# Patient Record
Sex: FEMALE | Race: WHITE | NOT HISPANIC OR LATINO | Employment: OTHER | URBAN - METROPOLITAN AREA
[De-identification: names, ages, dates, MRNs, and addresses within clinical notes are randomized per-mention and may not be internally consistent; named-entity substitution may affect disease eponyms.]

---

## 2017-03-30 ENCOUNTER — TRANSCRIBE ORDERS (OUTPATIENT)
Dept: LAB | Facility: OTHER | Age: 82
End: 2017-03-30

## 2017-03-30 ENCOUNTER — APPOINTMENT (OUTPATIENT)
Dept: LAB | Facility: OTHER | Age: 82
End: 2017-03-30
Payer: MEDICARE

## 2017-03-30 DIAGNOSIS — K21.9 GASTROESOPHAGEAL REFLUX DISEASE, ESOPHAGITIS PRESENCE NOT SPECIFIED: Primary | ICD-10-CM

## 2017-03-30 DIAGNOSIS — E03.9 UNSPECIFIED HYPOTHYROIDISM: ICD-10-CM

## 2017-03-30 LAB
ALBUMIN SERPL BCP-MCNC: 3.7 G/DL (ref 3.5–5)
ALP SERPL-CCNC: 79 U/L (ref 46–116)
ALT SERPL W P-5'-P-CCNC: 22 U/L (ref 12–78)
ANION GAP SERPL CALCULATED.3IONS-SCNC: 8 MMOL/L (ref 4–13)
AST SERPL W P-5'-P-CCNC: 11 U/L (ref 5–45)
BASOPHILS # BLD AUTO: 0.01 THOUSANDS/ΜL (ref 0–0.1)
BASOPHILS NFR BLD AUTO: 0 % (ref 0–1)
BILIRUB SERPL-MCNC: 0.75 MG/DL (ref 0.2–1)
BUN SERPL-MCNC: 23 MG/DL (ref 5–25)
CALCIUM SERPL-MCNC: 9.2 MG/DL (ref 8.3–10.1)
CHLORIDE SERPL-SCNC: 103 MMOL/L (ref 100–108)
CO2 SERPL-SCNC: 29 MMOL/L (ref 21–32)
CREAT SERPL-MCNC: 0.68 MG/DL (ref 0.6–1.3)
EOSINOPHIL # BLD AUTO: 0.07 THOUSAND/ΜL (ref 0–0.61)
EOSINOPHIL NFR BLD AUTO: 1 % (ref 0–6)
ERYTHROCYTE [DISTWIDTH] IN BLOOD BY AUTOMATED COUNT: 12.7 % (ref 11.6–15.1)
GFR SERPL CREATININE-BSD FRML MDRD: >60 ML/MIN/1.73SQ M
GLUCOSE P FAST SERPL-MCNC: 96 MG/DL (ref 65–99)
HCT VFR BLD AUTO: 43.6 % (ref 34.8–46.1)
HGB BLD-MCNC: 14.7 G/DL (ref 11.5–15.4)
LYMPHOCYTES # BLD AUTO: 1.73 THOUSANDS/ΜL (ref 0.6–4.47)
LYMPHOCYTES NFR BLD AUTO: 34 % (ref 14–44)
MCH RBC QN AUTO: 30.5 PG (ref 26.8–34.3)
MCHC RBC AUTO-ENTMCNC: 33.7 G/DL (ref 31.4–37.4)
MCV RBC AUTO: 91 FL (ref 82–98)
MONOCYTES # BLD AUTO: 0.35 THOUSAND/ΜL (ref 0.17–1.22)
MONOCYTES NFR BLD AUTO: 7 % (ref 4–12)
NEUTROPHILS # BLD AUTO: 2.86 THOUSANDS/ΜL (ref 1.85–7.62)
NEUTS SEG NFR BLD AUTO: 58 % (ref 43–75)
NRBC BLD AUTO-RTO: 0 /100 WBCS
PLATELET # BLD AUTO: 270 THOUSANDS/UL (ref 149–390)
PMV BLD AUTO: 9.9 FL (ref 8.9–12.7)
POTASSIUM SERPL-SCNC: 4.3 MMOL/L (ref 3.5–5.3)
PROT SERPL-MCNC: 7.3 G/DL (ref 6.4–8.2)
RBC # BLD AUTO: 4.82 MILLION/UL (ref 3.81–5.12)
SODIUM SERPL-SCNC: 140 MMOL/L (ref 136–145)
TSH SERPL DL<=0.05 MIU/L-ACNC: 1.09 UIU/ML (ref 0.36–3.74)
WBC # BLD AUTO: 5.04 THOUSAND/UL (ref 4.31–10.16)

## 2017-03-30 PROCEDURE — 85025 COMPLETE CBC W/AUTO DIFF WBC: CPT

## 2017-03-30 PROCEDURE — 84443 ASSAY THYROID STIM HORMONE: CPT

## 2017-03-30 PROCEDURE — 80053 COMPREHEN METABOLIC PANEL: CPT

## 2017-03-30 PROCEDURE — 36415 COLL VENOUS BLD VENIPUNCTURE: CPT

## 2018-01-10 NOTE — MISCELLANEOUS
Dear Ms Finnegan: We missed you for your originally scheduled neurological followup appointment with Dr Kassandra Cook  Please call at your earliest convenience to reschedule this appointment      Sincerely,     Salud Ramires           Electronically signed by:Kayla Bee   Feb 15 2016  5:43PM EST Co-author

## 2018-03-19 ENCOUNTER — APPOINTMENT (OUTPATIENT)
Dept: RADIOLOGY | Facility: CLINIC | Age: 83
End: 2018-03-19
Payer: MEDICARE

## 2018-03-19 ENCOUNTER — OFFICE VISIT (OUTPATIENT)
Dept: URGENT CARE | Facility: CLINIC | Age: 83
End: 2018-03-19
Payer: MEDICARE

## 2018-03-19 VITALS
DIASTOLIC BLOOD PRESSURE: 55 MMHG | BODY MASS INDEX: 19.45 KG/M2 | HEIGHT: 61 IN | TEMPERATURE: 98.4 F | SYSTOLIC BLOOD PRESSURE: 88 MMHG | HEART RATE: 110 BPM | RESPIRATION RATE: 16 BRPM | WEIGHT: 103 LBS | OXYGEN SATURATION: 98 %

## 2018-03-19 DIAGNOSIS — M25.552 HIP PAIN, ACUTE, LEFT: Primary | ICD-10-CM

## 2018-03-19 PROCEDURE — 99203 OFFICE O/P NEW LOW 30 MIN: CPT | Performed by: PHYSICIAN ASSISTANT

## 2018-03-19 PROCEDURE — G0463 HOSPITAL OUTPT CLINIC VISIT: HCPCS | Performed by: PHYSICIAN ASSISTANT

## 2018-03-19 PROCEDURE — 73502 X-RAY EXAM HIP UNI 2-3 VIEWS: CPT

## 2018-03-19 PROCEDURE — 72190 X-RAY EXAM OF PELVIS: CPT

## 2018-03-19 RX ORDER — LORAZEPAM 1 MG/1
1 TABLET ORAL DAILY
COMMUNITY
Start: 2015-11-11

## 2018-03-19 RX ORDER — LEVOTHYROXINE SODIUM 0.07 MG/1
75 TABLET ORAL
COMMUNITY

## 2018-03-19 RX ORDER — UBIDECARENONE 10 MG
1 CAPSULE ORAL DAILY
COMMUNITY

## 2018-03-19 RX ORDER — DONEPEZIL HYDROCHLORIDE 5 MG/1
5 TABLET, FILM COATED ORAL
Refills: 3 | COMMUNITY
Start: 2018-01-08

## 2018-03-19 RX ORDER — SENNOSIDES 8.6 MG
2 TABLET ORAL DAILY
COMMUNITY

## 2018-03-19 RX ORDER — BUSPIRONE HYDROCHLORIDE 5 MG/1
5 TABLET ORAL 3 TIMES DAILY
COMMUNITY
Start: 2016-07-07

## 2018-03-19 RX ORDER — BENZTROPINE MESYLATE 0.5 MG/1
1 TABLET ORAL 3 TIMES DAILY
COMMUNITY
Start: 2017-12-18 | End: 2018-07-29 | Stop reason: ALTCHOICE

## 2018-03-19 RX ORDER — DULOXETIN HYDROCHLORIDE 30 MG/1
30 CAPSULE, DELAYED RELEASE ORAL DAILY
COMMUNITY
Start: 2017-11-30

## 2018-03-19 NOTE — PATIENT INSTRUCTIONS
No proximal humerus fracture  No pelvic fracture on my read  Will have Radiology confrim  Continue wheel chair and walker/assistive device as needed  If not improved in 1 week see ortho  Tylenol for pain  Can ice over the hip/pelvis  Follow up with PCP in 3-5 days  Proceed to  ER if symptoms worsen

## 2018-03-19 NOTE — PROGRESS NOTES
[facility]        NAME: Natalya Downing is a [de-identified] y o  female  : 1935    MRN: [id number]  DATE: 2018  TIME: 9:30 AM    Assessment and Plan   Hip pain, acute, left [M25 552]  1  Hip pain, acute, left  XR hip/pelv 2-3 vws left if performed    XR pelvis complete 3+ vw         Patient Instructions     No proximal humerus fracture  No pelvic fracture on my read  Will have Radiology confrim  Continue wheel chair and walker/assistive device as needed  If not improved in 1 week see ortho  Tylenol for pain  Can ice over the hip/pelvis  Discussed Xr findings with her daughter  They will f/u with PCP  Follow up with PCP in 3-5 days  Proceed to  ER if symptoms worsen  Chief Complaint     Chief Complaint   Patient presents with    Fall     Pt fell on left side yesterday getting up from chair     Hip Pain     Left         History of Present Illness         8year-old female presents with her daughter for left-sided hip pain from yesterday  She fell out of chair on her left hip and had some pain  She never put weight on it after the fall  They headache carry her to transfer her from a bed to wheelchair  She normally uses a walker  She has been in the wheelchair since the fall  Patient is a bit confusing his Parkinson's  Family relates that she was localizing the pain in the left side but has been moving her leg          Review of Systems   Review of Systems      Current Medications       Current Outpatient Prescriptions:     benztropine (COGENTIN) 0 5 mg tablet, 1 tablet 3 (three) times a day, Disp: , Rfl:     busPIRone (BUSPAR) 5 mg tablet, 5 mg 3 (three) times a day, Disp: , Rfl:     DULoxetine (CYMBALTA) 30 mg delayed release capsule, 30 mg daily, Disp: , Rfl:     LORazepam (ATIVAN) 1 mg tablet, Take 1 tablet by mouth daily, Disp: , Rfl:     aspirin 81 MG tablet, Take 1 tablet by mouth, Disp: , Rfl:     carbidopa-levodopa (SINEMET)  mg per tablet, Take 1 tablet by mouth 4 (four) times a day, Disp: , Rfl:     Coenzyme Q10 10 MG capsule, Take 1 capsule by mouth daily, Disp: , Rfl:     donepezil (ARICEPT) 5 mg tablet, Take 5 mg by mouth daily at bedtime, Disp: , Rfl: 3    levothyroxine 75 mcg tablet, Take 75 mcg by mouth, Disp: , Rfl:     senna (SENOKOT) 8 6 mg, 2 tablets daily, Disp: , Rfl:     Current Allergies     Allergies as of 03/19/2018    (No Known Allergies)            The following portions of the patient's history were reviewed and updated as appropriate: allergies, current medications, past family history, past medical history, past social history, past surgical history and problem list      Past Medical History:   Diagnosis Date    [facility]        History reviewed  No pertinent surgical history  History reviewed  No pertinent family history  Medications have been verified  Objective   BP (!) 88/40   Pulse (!) 110   Temp 98 4 °F (36 9 °C) (Tympanic)   Resp 16   Ht 5' 1" (1 549 m)   Wt 46 7 kg (103 lb) Comment: stated by , pt unable to stand on scale  SpO2 98%   BMI 19 46 kg/m²        Physical Exam     Physical Exam   Constitutional: She appears distressed  HENT:   Head: Normocephalic and atraumatic  Musculoskeletal:     Left hip nontender in the groin or lateral over greater truck  Full passive range of motion of the hip without resisting me  She is tender along the posterior lateral pelvic rim and the sacrum  No tenderness along the pubic bone  Neurovascular intact left foot  Neurological: She is alert  Coordination normal      Mild tremor    Patient does withdraw from pain           XR hip/pelv 2-3 vws left if performed   Final Result by Mary Sanford MD (03/19 1137)      No hip fracture or dislocation visible            Workstation performed: [id number]         XR pelvis complete 3+ vw   Final Result by Mary Sanford MD (03/19 1127)      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

## 2018-04-06 DIAGNOSIS — G20 PD (PARKINSON'S DISEASE) (HCC): Primary | ICD-10-CM

## 2018-04-06 RX ORDER — CARBIDOPA/LEVODOPA 25MG-250MG
TABLET ORAL
Qty: 120 TABLET | Refills: 3 | Status: SHIPPED | OUTPATIENT
Start: 2018-04-06

## 2018-06-04 ENCOUNTER — TRANSCRIBE ORDERS (OUTPATIENT)
Dept: ADMINISTRATIVE | Facility: HOSPITAL | Age: 83
End: 2018-06-04

## 2018-06-04 ENCOUNTER — APPOINTMENT (OUTPATIENT)
Dept: LAB | Facility: CLINIC | Age: 83
End: 2018-06-04
Payer: MEDICARE

## 2018-06-04 DIAGNOSIS — I51.9 MYXEDEMA HEART DISEASE: Primary | ICD-10-CM

## 2018-06-04 DIAGNOSIS — E03.9 MYXEDEMA HEART DISEASE: Primary | ICD-10-CM

## 2018-06-04 DIAGNOSIS — D68.52 HETEROZYGOUS FOR PROTHROMBIN G20210A MUTATION (HCC): ICD-10-CM

## 2018-06-04 DIAGNOSIS — E03.9 MYXEDEMA HEART DISEASE: ICD-10-CM

## 2018-06-04 DIAGNOSIS — I51.9 MYXEDEMA HEART DISEASE: ICD-10-CM

## 2018-06-04 LAB
ALBUMIN SERPL BCP-MCNC: 3.9 G/DL (ref 3.5–5)
ALP SERPL-CCNC: 85 U/L (ref 46–116)
ALT SERPL W P-5'-P-CCNC: 16 U/L (ref 12–78)
ANION GAP SERPL CALCULATED.3IONS-SCNC: 8 MMOL/L (ref 4–13)
AST SERPL W P-5'-P-CCNC: 11 U/L (ref 5–45)
BASOPHILS # BLD AUTO: 0.03 THOUSANDS/ΜL (ref 0–0.1)
BASOPHILS NFR BLD AUTO: 1 % (ref 0–1)
BILIRUB SERPL-MCNC: 0.7 MG/DL (ref 0.2–1)
BUN SERPL-MCNC: 26 MG/DL (ref 5–25)
CALCIUM SERPL-MCNC: 9.3 MG/DL (ref 8.3–10.1)
CHLORIDE SERPL-SCNC: 103 MMOL/L (ref 100–108)
CO2 SERPL-SCNC: 27 MMOL/L (ref 21–32)
CREAT SERPL-MCNC: 0.66 MG/DL (ref 0.6–1.3)
EOSINOPHIL # BLD AUTO: 0.07 THOUSAND/ΜL (ref 0–0.61)
EOSINOPHIL NFR BLD AUTO: 1 % (ref 0–6)
ERYTHROCYTE [DISTWIDTH] IN BLOOD BY AUTOMATED COUNT: 12.5 % (ref 11.6–15.1)
GFR SERPL CREATININE-BSD FRML MDRD: 84 ML/MIN/1.73SQ M
GLUCOSE P FAST SERPL-MCNC: 83 MG/DL (ref 65–99)
HCT VFR BLD AUTO: 46.7 % (ref 34.8–46.1)
HGB BLD-MCNC: 14.8 G/DL (ref 11.5–15.4)
IMM GRANULOCYTES # BLD AUTO: 0.03 THOUSAND/UL (ref 0–0.2)
IMM GRANULOCYTES NFR BLD AUTO: 1 % (ref 0–2)
LYMPHOCYTES # BLD AUTO: 1.47 THOUSANDS/ΜL (ref 0.6–4.47)
LYMPHOCYTES NFR BLD AUTO: 25 % (ref 14–44)
MCH RBC QN AUTO: 30.5 PG (ref 26.8–34.3)
MCHC RBC AUTO-ENTMCNC: 31.7 G/DL (ref 31.4–37.4)
MCV RBC AUTO: 96 FL (ref 82–98)
MONOCYTES # BLD AUTO: 0.41 THOUSAND/ΜL (ref 0.17–1.22)
MONOCYTES NFR BLD AUTO: 7 % (ref 4–12)
NEUTROPHILS # BLD AUTO: 3.9 THOUSANDS/ΜL (ref 1.85–7.62)
NEUTS SEG NFR BLD AUTO: 65 % (ref 43–75)
NRBC BLD AUTO-RTO: 0 /100 WBCS
PLATELET # BLD AUTO: 321 THOUSANDS/UL (ref 149–390)
PMV BLD AUTO: 9.2 FL (ref 8.9–12.7)
POTASSIUM SERPL-SCNC: 4.2 MMOL/L (ref 3.5–5.3)
PROT SERPL-MCNC: 7.5 G/DL (ref 6.4–8.2)
RBC # BLD AUTO: 4.85 MILLION/UL (ref 3.81–5.12)
SODIUM SERPL-SCNC: 138 MMOL/L (ref 136–145)
TSH SERPL DL<=0.05 MIU/L-ACNC: 0.82 UIU/ML (ref 0.36–3.74)
WBC # BLD AUTO: 5.91 THOUSAND/UL (ref 4.31–10.16)

## 2018-06-04 PROCEDURE — 80053 COMPREHEN METABOLIC PANEL: CPT

## 2018-06-04 PROCEDURE — 36415 COLL VENOUS BLD VENIPUNCTURE: CPT

## 2018-06-04 PROCEDURE — 85025 COMPLETE CBC W/AUTO DIFF WBC: CPT

## 2018-06-04 PROCEDURE — 84443 ASSAY THYROID STIM HORMONE: CPT

## 2018-07-29 ENCOUNTER — APPOINTMENT (OUTPATIENT)
Dept: RADIOLOGY | Facility: CLINIC | Age: 83
End: 2018-07-29
Payer: MEDICARE

## 2018-07-29 ENCOUNTER — OFFICE VISIT (OUTPATIENT)
Dept: URGENT CARE | Facility: CLINIC | Age: 83
End: 2018-07-29
Payer: MEDICARE

## 2018-07-29 VITALS
SYSTOLIC BLOOD PRESSURE: 138 MMHG | OXYGEN SATURATION: 97 % | HEART RATE: 92 BPM | TEMPERATURE: 98.4 F | RESPIRATION RATE: 20 BRPM | WEIGHT: 99 LBS | HEIGHT: 60 IN | BODY MASS INDEX: 19.44 KG/M2 | DIASTOLIC BLOOD PRESSURE: 70 MMHG

## 2018-07-29 DIAGNOSIS — R06.00 DYSPNEA, UNSPECIFIED TYPE: ICD-10-CM

## 2018-07-29 DIAGNOSIS — R06.00 DYSPNEA, UNSPECIFIED TYPE: Primary | ICD-10-CM

## 2018-07-29 PROCEDURE — G0463 HOSPITAL OUTPT CLINIC VISIT: HCPCS | Performed by: PHYSICIAN ASSISTANT

## 2018-07-29 PROCEDURE — 99203 OFFICE O/P NEW LOW 30 MIN: CPT | Performed by: PHYSICIAN ASSISTANT

## 2018-07-29 PROCEDURE — 71046 X-RAY EXAM CHEST 2 VIEWS: CPT

## 2018-07-29 RX ORDER — LORAZEPAM 1 MG/1
TABLET ORAL
COMMUNITY
Start: 2018-05-12

## 2018-07-29 NOTE — PATIENT INSTRUCTIONS
1  Shortness of breath  -02 sat is 97% and patient appears comfortable and is in no distress  -Chest x-ray is negative for acute abnormality- if radiology read differs I will call you  -Call PCP tomorrow for follow-up    Go to ER with worsening symptoms, shortness of breath, fever, chest pain, or any signs of distress    Dyspnea   AMBULATORY CARE:   What is dyspnea? Dyspnea is breathing difficulty or discomfort  You may have labored, painful, or shallow breathing  You may feel breathless or short of breath  Dyspnea can occur during rest or with activity  You may have dyspnea for a short time, or it might become chronic  Dyspnea is often a symptom of a disease or condition  An allergic reaction, anxiety, or travel to high altitudes can increase your risk for dyspnea  Your risk is also increased by a lung condition such as asthma, a heart condition such as heart failure, or a nerve condition  Being overweight or smoking cigarettes can also lead to dyspnea  Signs and symptoms that can occur with dyspnea:   · Chest tightness or pain    · Cough or a coarse or high-pitched noise when you breathe    · Pale and sweaty, cool skin    · Confusion and tiredness    · Bluish-gray lips or nails  Seek care immediately if:   · Your signs and symptoms are the same or worse within 24 hours of treatment  · You have shaking chills or a fever over 102°F      · You have new pain, pressure, or tightness in your chest      · You have a new or worse cough or wheezing, or you cough up blood  · You feel like you cannot get enough air  · The skin over your ribs or on your neck sinks in when you breathe  · You have a severe headache with vomiting and abdominal pain  · You feel confused or dizzy  Contact your healthcare provider if:   · You have questions or concerns about your condition or care  Treatment:  You will work with your healthcare provider to treat the condition causing your dyspnea   You may need the following to improve your symptoms:  · Oxygen therapy  may be used to help you breathe easier  You may need oxygen if your blood oxygen level is lower than it should be  · Medicines  may be used to treat the cause of your dyspnea  Medicines may reduce swelling in your airway or decrease extra fluid from around your heart or lungs  Other medicines may be used to decrease anxiety and help you feel calm and relaxed  · Pulmonary rehabilitation  is used to reduce your symptoms while keeping you active  You may learn breathing techniques, muscle strengthening, and how to pace yourself when you are active  Manage long-term dyspnea:   · Create an action plan  You and your healthcare provider can work together to create a plan for how to handle episodes of dyspnea  The plan can include daily activities, treatment changes, and what to do if you have severe breathing problems  · Lean forward on your elbows when you sit  This helps your lungs expand and may make it easier to breathe  · Use pursed-lip breathing any time you feel short of breath  Breathe in through your nose and then slowly breathe out through your mouth with your lips slightly puckered  It should take you twice as long to breathe out as it did to breathe in  · Do not smoke  Nicotine and other chemicals in cigarettes and cigars can cause lung damage and make it harder to breathe  Ask your healthcare provider for information if you currently smoke and need help to quit  E-cigarettes or smokeless tobacco still contain nicotine  Talk to your healthcare provider before you use these products  · Reach or maintain a healthy weight  Your healthcare provider can help you create a safe weight loss plan if you are overweight  · Exercise as directed  Exercise can help your lungs work more easily  Exercise can also help you lose weight if needed  Try to get at least 30 minutes of exercise most days of the week   Your healthcare provider can help you create an exercise plan that is safe for you  Follow up with your healthcare provider or specialist as directed:  Write down your questions so you remember to ask them during your visits  © 2017 [employer] Information is for End User's use only and may not be sold, redistributed or otherwise used for commercial purposes  All illustrations and images included in CareNotes® are the copyrighted property of A [employer]  or [employer]  The above information is an  only  It is not intended as medical advice for individual conditions or treatments  Talk to your doctor, nurse or pharmacist before following any medical regimen to see if it is safe and effective for you

## 2018-07-29 NOTE — PROGRESS NOTES
[facility]        NAME: Gosia Fermin is a [de-identified] y o  female  : 1935    MRN: [id number]  DATE: 2018  TIME: 9:28 AM    Assessment and Plan   Dyspnea, unspecified type [R06 00]  1  Dyspnea, unspecified type  XR chest pa & lateral         Patient Instructions     1  Shortness of breath  -02 sat is 97% and patient appears comfortable and is in no distress  Patient has no complaints of chest pain or shortness of breath on exam  -Chest x-ray is negative for acute abnormality- if radiology read differs I will call you  -Call PCP tomorrow for follow-up    Go to ER with worsening symptoms, shortness of breath, fever, chest pain, or any signs of distress    Chief Complaint     Chief Complaint   Patient presents with    Shortness of Breath     x one week on and off         History of Present Illness       Patient is an 82-year-old female with a medical history of Parkinson's disease and dementia who presents today with her  and her daughter for evaluation of shortness of breath  History is obtained via the patient's daughter  Patient's daughter states that for the past week and a half, the patient has been randomly stating that she cannot breathe  Patient's  states that this morning around 7:00 a m  while sitting at the kitchen table, the patient stated that she could not breathe  Patient's  states however the patient was clearly breathing and was not in any distress  He states that it only lasted for a few seconds  They state that at this time the patient appears to be in her normal state of health  The patient's daughter feels that this happens when the patient gets anxious  The patient's daughter also states that she has been eating less than normal as well  The patient denies having any chest pain or shortness of breath at this time   They have an appointment on Wednesday with her Neurologist         Review of Systems   Review of Systems   Constitutional: Negative for chills and fever  Respiratory: Negative for cough and shortness of breath  Cardiovascular: Negative for chest pain  Gastrointestinal: Negative for abdominal pain  Neurological: Negative for headaches  Current Medications       Current Outpatient Prescriptions:     LORazepam (ATIVAN) 1 mg tablet, TAKE 1 OR 2 TABLETS AT BEDTIME, Disp: , Rfl:     aspirin 81 MG tablet, Take 1 tablet by mouth, Disp: , Rfl:     busPIRone (BUSPAR) 5 mg tablet, 5 mg 3 (three) times a day, Disp: , Rfl:     carbidopa-levodopa (SINEMET)  mg per tablet, Take 1 tablet by mouth 4 (four) times a day, Disp: , Rfl:     carbidopa-levodopa (SINEMET)  mg per tablet, TAKE 1 TABLET BY MOUTH EVERY 6 HOURS, Disp: 120 tablet, Rfl: 3    Coenzyme Q10 10 MG capsule, Take 1 capsule by mouth daily, Disp: , Rfl:     donepezil (ARICEPT) 5 mg tablet, Take 5 mg by mouth daily at bedtime, Disp: , Rfl: 3    DULoxetine (CYMBALTA) 30 mg delayed release capsule, 30 mg daily, Disp: , Rfl:     levothyroxine 75 mcg tablet, Take 75 mcg by mouth, Disp: , Rfl:     LORazepam (ATIVAN) 1 mg tablet, Take 1 tablet by mouth daily, Disp: , Rfl:     senna (SENOKOT) 8 6 mg, 2 tablets daily, Disp: , Rfl:     Current Allergies     Allergies as of 07/29/2018    (No Known Allergies)            The following portions of the patient's history were reviewed and updated as appropriate: allergies, current medications, past family history, past medical history, past social history, past surgical history and problem list      Past Medical History:   Diagnosis Date    [facility]        No past surgical history on file  No family history on file  Medications have been verified  Objective   /70   Pulse 92   Temp 98 4 °F (36 9 °C) (Oral)   Resp 20   Ht 5' (1 524 m)   Wt 44 9 kg (99 lb)   SpO2 97%   BMI 19 33 kg/m²        Physical Exam     Physical Exam   Constitutional: She appears well-developed and well-nourished  No distress  HENT:   Mouth/Throat: Oropharynx is clear and moist    Cardiovascular: Normal rate, regular rhythm and normal heart sounds  Pulmonary/Chest: Effort normal and breath sounds normal  No respiratory distress  She has no wheezes  She has no rales  Neurological: She is alert  Patient has dementia and Parkinson's   Skin: Skin is warm and dry  She is not diaphoretic  Psychiatric: She has a normal mood and affect  Nursing note and vitals reviewed

## 2019-04-20 ENCOUNTER — APPOINTMENT (OUTPATIENT)
Dept: LAB | Facility: CLINIC | Age: 84
End: 2019-04-20
Payer: MEDICARE

## 2019-04-20 ENCOUNTER — TRANSCRIBE ORDERS (OUTPATIENT)
Dept: ADMINISTRATIVE | Facility: HOSPITAL | Age: 84
End: 2019-04-20

## 2019-04-20 DIAGNOSIS — R30.0 DYSURIA: ICD-10-CM

## 2019-04-20 DIAGNOSIS — R30.0 DYSURIA: Primary | ICD-10-CM

## 2019-04-20 LAB
BILIRUB UR QL STRIP: NEGATIVE
CLARITY UR: CLEAR
COLOR UR: YELLOW
GLUCOSE UR STRIP-MCNC: NEGATIVE MG/DL
HGB UR QL STRIP.AUTO: NEGATIVE
KETONES UR STRIP-MCNC: NEGATIVE MG/DL
LEUKOCYTE ESTERASE UR QL STRIP: NEGATIVE
NITRITE UR QL STRIP: NEGATIVE
PH UR STRIP.AUTO: 7.5 [PH]
PROT UR STRIP-MCNC: NEGATIVE MG/DL
SP GR UR STRIP.AUTO: 1.02 (ref 1–1.03)
UROBILINOGEN UR QL STRIP.AUTO: 0.2 E.U./DL

## 2019-04-20 PROCEDURE — 81003 URINALYSIS AUTO W/O SCOPE: CPT | Performed by: INTERNAL MEDICINE

## 2019-04-20 PROCEDURE — 87086 URINE CULTURE/COLONY COUNT: CPT

## 2019-04-21 LAB — BACTERIA UR CULT: NORMAL

## 2020-07-14 ENCOUNTER — OFFICE VISIT (OUTPATIENT)
Dept: URGENT CARE | Facility: CLINIC | Age: 85
End: 2020-07-14
Payer: MEDICARE

## 2020-07-14 VITALS
BODY MASS INDEX: 24.74 KG/M2 | SYSTOLIC BLOOD PRESSURE: 161 MMHG | DIASTOLIC BLOOD PRESSURE: 69 MMHG | HEART RATE: 61 BPM | TEMPERATURE: 97.9 F | WEIGHT: 126 LBS | OXYGEN SATURATION: 99 % | HEIGHT: 60 IN | RESPIRATION RATE: 16 BRPM

## 2020-07-14 DIAGNOSIS — H92.03 EAR PAIN, BILATERAL: Primary | ICD-10-CM

## 2020-07-14 PROCEDURE — 99213 OFFICE O/P EST LOW 20 MIN: CPT | Performed by: PHYSICIAN ASSISTANT

## 2020-07-14 PROCEDURE — G0463 HOSPITAL OUTPT CLINIC VISIT: HCPCS | Performed by: PHYSICIAN ASSISTANT

## 2020-07-14 NOTE — PROGRESS NOTES
[facility]        NAME: Radha Campbell is a 82 y o  female  : 1935    MRN: [id number]  DATE: 2020  TIME: 5:51 PM    Assessment and Plan   Ear pain, bilateral [H92 03]  1  Ear pain, bilateral  Ambulatory Referral to Otolaryngology         Patient Instructions     Patient Instructions   1  Bilateral ear pain  -No sign of infection on exam  -No sign of wax build up on exam  -You can try using flonase nasal spray as directed  -Recommend following up with ENT for re-evaluation of symptoms  [facility]: [phone]    Go to ER with worsening symptoms or any signs of distress     Follow up with PCP in 3-5 days  Proceed to  ER if symptoms worsen  Chief Complaint     Chief Complaint   Patient presents with    Earache     Bilateral ear pain for >6 months  Neurologist suggested getting ears flushed here  History of Present Illness       Patient is an 53-year-old female with a medical history of Parkinson's disease who presents today for evaluation of bilateral ear pain which has been ongoing for greater than 6 months  Patient complains in her right ear is worse than her left  Patient's  states that he has been in contact with the patient's neurologist who recommended the patient come here to have her ears evaluated and possibly flushed  No fevers or chills  Otherwise, patient has no complaints  Review of Systems   Review of Systems   Constitutional: Negative for chills and fever  HENT: Positive for ear pain  Negative for rhinorrhea and sore throat  Respiratory: Negative for shortness of breath  Cardiovascular: Negative for chest pain  Neurological: Negative for headaches  All other systems reviewed and are negative          Current Medications       Current Outpatient Medications:     aspirin 81 MG tablet, Take 1 tablet by mouth, Disp: , Rfl:     busPIRone (BUSPAR) 5 mg tablet, 5 mg 3 (three) times a day, Disp: , Rfl:    carbidopa-levodopa (SINEMET)  mg per tablet, Take 1 tablet by mouth 4 (four) times a day, Disp: , Rfl:     carbidopa-levodopa (SINEMET)  mg per tablet, TAKE 1 TABLET BY MOUTH EVERY 6 HOURS, Disp: 120 tablet, Rfl: 3    DULoxetine (CYMBALTA) 30 mg delayed release capsule, 30 mg daily, Disp: , Rfl:     levothyroxine 75 mcg tablet, Take 75 mcg by mouth, Disp: , Rfl:     senna (SENOKOT) 8 6 mg, 2 tablets daily, Disp: , Rfl:     Coenzyme Q10 10 MG capsule, Take 1 capsule by mouth daily, Disp: , Rfl:     donepezil (ARICEPT) 5 mg tablet, Take 5 mg by mouth daily at bedtime, Disp: , Rfl: 3    LORazepam (ATIVAN) 1 mg tablet, Take 1 tablet by mouth daily, Disp: , Rfl:     LORazepam (ATIVAN) 1 mg tablet, TAKE 1 OR 2 TABLETS AT BEDTIME, Disp: , Rfl:     Current Allergies     Allergies as of 07/14/2020    (No Known Allergies)            The following portions of the patient's history were reviewed and updated as appropriate: allergies, current medications, past family history, past medical history, past social history, past surgical history and problem list      Past Medical History:   Diagnosis Date    Parkinson's disease ([facility] )        History reviewed  No pertinent surgical history  History reviewed  No pertinent family history  Medications have been verified  Objective   /69   Pulse 61   Temp 97 9 °F (36 6 °C) (Temporal)   Resp 16   Ht 5' (1 524 m)   Wt 57 2 kg (126 lb)   SpO2 99%   BMI 24 61 kg/m²        Physical Exam     Physical Exam   Constitutional: She is oriented to person, place, and time  She appears well-developed and well-nourished  No distress  Son is at the bedside   HENT:   Head: Normocephalic and atraumatic     Right Ear: Tympanic membrane, external ear and ear canal normal    Left Ear: Tympanic membrane, external ear and ear canal normal    Nose: Nose normal    Mouth/Throat: Uvula is midline, oropharynx is clear and moist and mucous membranes are normal  Cardiovascular: Normal rate  Pulmonary/Chest: Effort normal    Neurological: She is alert and oriented to person, place, and time  Skin: Skin is warm and dry  Psychiatric: She has a normal mood and affect  Nursing note and vitals reviewed

## 2020-07-14 NOTE — PATIENT INSTRUCTIONS
1  Bilateral ear pain  -No sign of infection on exam  -No sign of wax build up on exam  -You can try using flonase nasal spray as directed  -Recommend following up with ENT for re-evaluation of symptoms   [facility]: [phone]    Go to ER with worsening symptoms or any signs of distress